# Patient Record
Sex: FEMALE | Race: WHITE | NOT HISPANIC OR LATINO | Employment: STUDENT | URBAN - METROPOLITAN AREA
[De-identification: names, ages, dates, MRNs, and addresses within clinical notes are randomized per-mention and may not be internally consistent; named-entity substitution may affect disease eponyms.]

---
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
## 2023-01-04 ENCOUNTER — OFFICE VISIT (OUTPATIENT)
Dept: FAMILY MEDICINE CLINIC | Facility: CLINIC | Age: 18
End: 2023-01-04
Payer: COMMERCIAL

## 2023-01-04 VITALS
HEART RATE: 75 BPM | TEMPERATURE: 98.4 F | HEIGHT: 67 IN | DIASTOLIC BLOOD PRESSURE: 80 MMHG | WEIGHT: 105 LBS | OXYGEN SATURATION: 99 % | BODY MASS INDEX: 16.48 KG/M2 | SYSTOLIC BLOOD PRESSURE: 116 MMHG

## 2023-01-04 DIAGNOSIS — F41.9 ANXIETY AND DEPRESSION: Primary | ICD-10-CM

## 2023-01-04 DIAGNOSIS — F32.A ANXIETY AND DEPRESSION: Primary | ICD-10-CM

## 2023-01-04 PROBLEM — R00.2 PALPITATIONS IN PEDIATRIC PATIENT: Status: RESOLVED | Noted: 2020-09-01 | Resolved: 2023-01-04

## 2023-01-04 PROBLEM — F50.82 AVOIDANT-RESTRICTIVE FOOD INTAKE DISORDER (ARFID): Status: ACTIVE | Noted: 2023-01-04

## 2023-01-04 PROBLEM — R55 PRE-SYNCOPE: Status: RESOLVED | Noted: 2019-01-11 | Resolved: 2023-01-04

## 2023-01-04 PROCEDURE — 99213 OFFICE O/P EST LOW 20 MIN: CPT | Performed by: FAMILY MEDICINE

## 2023-01-04 NOTE — PROGRESS NOTES
Subjective   Bhavani Hyde is a 17 y.o. female who is here for   Chief Complaint   Patient presents with   • Anxiety     Needing college accomodation paperwork filled out    .   If you all could please mention how it would be better for my mental health to live with my sister, I would greatly appreciate it   Im trying to move out of my [employer] dorm and the only way i am able to move out is if i said proof of my depression and anxiety. 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  Has longstanding anxiety depression eating disorders.  She has had a steady roommate in the door for the past 1-1/2 years.  But the roommate is now not returning and go and take school from home.  She will we will be receiving a random roommate which makes her quite anxious.  Good news is her older sister is also a student at [employer] and lives in an apartment style setting which is still University property.  With 2 roommates.  They have an opening for Bhavani.  Bhavani would likely thrive if she moves over with her older sister who provide her help with her anxiety depression and eating disorder.  She remains on 15 mg of Lexapro    The following portions of the patient's history were reviewed and updated as appropriate: allergies, current medications, past family history, past medical history, past social history, past surgical history and problem list.    Review of Systems    Objective   Vitals:    01/04/23 1439   BP: 116/80   BP Location: Left arm   Patient Position: Sitting   Cuff Size: Adult   Pulse: 75   Temp: 98.4 °F (36.9 °C)   SpO2: 99%   Weight: 47.6 kg (105 lb)   Height: 168.9 cm (66.5\")      Physical Exam  Vitals reviewed.   Constitutional:       Appearance: She is underweight.         Assessment & Plan   Diagnoses and all orders for this visit:    1. Anxiety and depression  (Primary)    Filled out a letter asking her University to allow her to change her living arrangements    There are no Patient Instructions on file for this visit.    There are no discontinued medications.     No follow-ups on file.    Dr. Alex Gant  [place], [place]

## 2023-02-22 DIAGNOSIS — N94.6 PAINFUL MENSTRUAL PERIODS: ICD-10-CM

## 2023-02-22 RX ORDER — NORGESTIMATE AND ETHINYL ESTRADIOL 0.25-0.035
1 KIT ORAL DAILY
Qty: 84 TABLET | Refills: 1 | Status: SHIPPED | OUTPATIENT
Start: 2023-02-22

## 2023-02-24 DIAGNOSIS — K21.9 GASTROESOPHAGEAL REFLUX DISEASE WITHOUT ESOPHAGITIS: ICD-10-CM

## 2023-02-24 RX ORDER — LANSOPRAZOLE 30 MG/1
30 CAPSULE, DELAYED RELEASE ORAL DAILY
Qty: 30 CAPSULE | Refills: 2 | Status: SHIPPED | OUTPATIENT
Start: 2023-02-24

## 2023-06-06 DIAGNOSIS — K21.9 GASTROESOPHAGEAL REFLUX DISEASE WITHOUT ESOPHAGITIS: ICD-10-CM

## 2023-06-06 DIAGNOSIS — F32.A ANXIETY AND DEPRESSION: ICD-10-CM

## 2023-06-06 DIAGNOSIS — Z13.220 SCREENING FOR LIPID DISORDERS: Primary | ICD-10-CM

## 2023-06-06 DIAGNOSIS — F41.9 ANXIETY AND DEPRESSION: ICD-10-CM

## 2023-10-11 ENCOUNTER — OFFICE VISIT (OUTPATIENT)
Dept: FAMILY MEDICINE CLINIC | Facility: CLINIC | Age: 18
End: 2023-10-11
Payer: COMMERCIAL

## 2023-10-11 VITALS
OXYGEN SATURATION: 98 % | BODY MASS INDEX: 16.48 KG/M2 | HEART RATE: 72 BPM | HEIGHT: 67 IN | DIASTOLIC BLOOD PRESSURE: 60 MMHG | TEMPERATURE: 98 F | WEIGHT: 105 LBS | SYSTOLIC BLOOD PRESSURE: 100 MMHG

## 2023-10-11 DIAGNOSIS — N30.00 ACUTE CYSTITIS WITHOUT HEMATURIA: ICD-10-CM

## 2023-10-11 DIAGNOSIS — R30.0 DYSURIA: Primary | ICD-10-CM

## 2023-10-11 DIAGNOSIS — K58.0 IRRITABLE BOWEL SYNDROME WITH DIARRHEA: ICD-10-CM

## 2023-10-11 LAB
BILIRUB BLD-MCNC: NEGATIVE MG/DL
CLARITY, POC: ABNORMAL
COLOR UR: YELLOW
GLUCOSE UR STRIP-MCNC: NEGATIVE MG/DL
KETONES UR QL: NEGATIVE
LEUKOCYTE EST, POC: ABNORMAL
NITRITE UR-MCNC: NEGATIVE MG/ML
PH UR: 5 [PH] (ref 5–8)
PROT UR STRIP-MCNC: ABNORMAL MG/DL
RBC # UR STRIP: ABNORMAL /UL
SP GR UR: 1.03 (ref 1–1.03)
UROBILINOGEN UR QL: NORMAL

## 2023-10-11 PROCEDURE — 99213 OFFICE O/P EST LOW 20 MIN: CPT | Performed by: FAMILY MEDICINE

## 2023-10-11 PROCEDURE — 81002 URINALYSIS NONAUTO W/O SCOPE: CPT | Performed by: FAMILY MEDICINE

## 2023-10-11 RX ORDER — ZINC OXIDE 13 %
1 CREAM (GRAM) TOPICAL DAILY
Qty: 90 CAPSULE | Refills: 1 | Status: SHIPPED | OUTPATIENT
Start: 2023-10-11

## 2023-10-11 RX ORDER — CIPROFLOXACIN 500 MG/1
500 TABLET, FILM COATED ORAL 2 TIMES DAILY
Qty: 6 TABLET | Refills: 0 | Status: SHIPPED | OUTPATIENT
Start: 2023-10-11

## 2023-10-11 RX ORDER — DICYCLOMINE HYDROCHLORIDE 10 MG/1
10 CAPSULE ORAL
Qty: 120 CAPSULE | Refills: 0 | Status: SHIPPED | OUTPATIENT
Start: 2023-10-11

## 2023-10-11 NOTE — PROGRESS NOTES
"  Subjective   Bhavani Hyde is a 18 y.o. female who is here for   Chief Complaint   Patient presents with    Abdominal Pain    Difficulty Urinating     History of Present Illness     Bhavani comes in with urinary tract symptoms.  Increased frequency burning with urination.  Her menstrual cycle ended a couple days ago.  On birth control pills.  Not sexually active.    Unfortunate she dropped out of college.  She was going to [employer].  She would like to enroll in [employer] college this spring.  Just landed a job as a .    Depression is somewhat stable.    Continues to have chronic abdominal complaints.  Not gaining weight.  Reports when she eats she has loose stools.  We checked her food allergy panel last time was normal.      The following portions of the patient's history were reviewed and updated as appropriate: allergies, current medications, past family history, past medical history, past social history, past surgical history, and problem list.    Review of Systems    Objective   Vitals:    10/11/23 1055   BP: 100/60   Pulse: 72   Temp: 98 øF (36.7 øC)   SpO2: 98%   Weight: 47.6 kg (105 lb)   Height: 168.9 cm (66.5\")      Physical Exam  Vitals reviewed.   Neurological:      Mental Status: She is alert.       Results for orders placed or performed in visit on 10/11/23   POC Urinalysis Dipstick    Specimen: Urine   Result Value Ref Range    Color Yellow Yellow, Straw, Dark Yellow, Mel    Clarity, UA Cloudy (A) Clear    Glucose, UA Negative Negative mg/dL    Bilirubin Negative Negative    Ketones, UA Negative Negative    Specific Gravity  1.030 1.005 - 1.030    Blood, UA Large (A) Negative    pH, Urine 5.0 5.0 - 8.0    Protein, POC 1+ (A) Negative mg/dL    Urobilinogen, UA Normal Normal, 0.2 E.U./dL    Leukocytes Small (1+) (A) Negative    Nitrite, UA Negative Negative         Assessment & Plan   Diagnoses and all orders for this visit:    1. Dysuria (Primary)  -     POC Urinalysis " Dipstick  -     ciprofloxacin (Cipro) 500 MG tablet; Take 1 tablet by mouth 2 (Two) Times a Day.  Dispense: 6 tablet; Refill: 0    2. Irritable bowel syndrome with diarrhea  -     dicyclomine (BENTYL) 10 MG capsule; Take 1 capsule by mouth 4 (Four) Times a Day Before Meals & at Bedtime.  Dispense: 120 capsule; Refill: 0  -     Probiotic Product (Probiotic Daily) capsule; Take 1 capsule by mouth Daily.  Dispense: 90 capsule; Refill: 1    3. Acute cystitis without hematuria  -     dicyclomine (BENTYL) 10 MG capsule; Take 1 capsule by mouth 4 (Four) Times a Day Before Meals & at Bedtime.  Dispense: 120 capsule; Refill: 0  -     Probiotic Product (Probiotic Daily) capsule; Take 1 capsule by mouth Daily.  Dispense: 90 capsule; Refill: 1      There are no Patient Instructions on file for this visit.    Medications Discontinued During This Encounter   Medication Reason    famotidine (PEPCID) 40 MG tablet *Therapy completed        No follow-ups on file.    Dr. Alex Gant  [place], [place]

## 2023-11-07 ENCOUNTER — TELEPHONE (OUTPATIENT)
Dept: FAMILY MEDICINE CLINIC | Facility: CLINIC | Age: 18
End: 2023-11-07

## 2023-11-07 NOTE — TELEPHONE ENCOUNTER
Caller: Bhavani Hyde    Relationship: Self    Best call back number: [phone]     What form or medical record are you requesting: LETTER STATING SHE IS ON MEDICATION FOR DEPRESSION AND ANXIETY     Who is requesting this form or medical record from you: [facility]     How would you like to receive the form or medical records (pick-up, mail, fax):     Timeframe paperwork needed: ASAP    Additional notes: PATIENT NEEDS TO HAVE A LETTER SENT TO [facility] STATING THAT SHE IS ON MEDICATION FOR ANXIETY AND DEPRESSION AND THAT IT IS PRESCRIBED BY HER PRIMARY CARE PROVIDER DR DOMINIQUE STEEL MD.     PLEASE CALL PATIENT WHEN THIS LETTER IS READY TO  AND SHE WILL COME BY TO GET IT.

## 2023-11-26 ENCOUNTER — HOSPITAL ENCOUNTER (EMERGENCY)
Facility: HOSPITAL | Age: 18
Discharge: HOME | End: 2023-11-26
Attending: EMERGENCY MEDICINE | Admitting: EMERGENCY MEDICINE
Payer: COMMERCIAL

## 2023-11-26 VITALS
WEIGHT: 105 LBS | HEART RATE: 86 BPM | OXYGEN SATURATION: 100 % | HEIGHT: 66 IN | TEMPERATURE: 97.8 F | DIASTOLIC BLOOD PRESSURE: 84 MMHG | RESPIRATION RATE: 17 BRPM | SYSTOLIC BLOOD PRESSURE: 130 MMHG | BODY MASS INDEX: 16.88 KG/M2

## 2023-11-26 DIAGNOSIS — F41.0 ANXIETY ATTACK: Primary | ICD-10-CM

## 2023-11-26 LAB
ALBUMIN SERPL-MCNC: 4.7 G/DL (ref 3.5–5.2)
ALBUMIN/GLOB SERPL: 1.7 G/DL
ALP SERPL-CCNC: 53 U/L (ref 39–117)
ALT SERPL W P-5'-P-CCNC: 13 U/L (ref 1–33)
ANION GAP SERPL CALCULATED.3IONS-SCNC: 8.6 MMOL/L (ref 5–15)
AST SERPL-CCNC: 19 U/L (ref 1–32)
BASOPHILS # BLD AUTO: 0.05 10*3/MM3 (ref 0–0.2)
BASOPHILS NFR BLD AUTO: 0.6 % (ref 0–1.5)
BILIRUB SERPL-MCNC: 0.8 MG/DL (ref 0–1.2)
BUN SERPL-MCNC: 9 MG/DL (ref 6–20)
BUN/CREAT SERPL: 10.5 (ref 7–25)
CALCIUM SPEC-SCNC: 9.7 MG/DL (ref 8.6–10.5)
CHLORIDE SERPL-SCNC: 102 MMOL/L (ref 98–107)
CO2 SERPL-SCNC: 24.4 MMOL/L (ref 22–29)
CREAT SERPL-MCNC: 0.86 MG/DL (ref 0.57–1)
DEPRECATED RDW RBC AUTO: 41.7 FL (ref 37–54)
EGFRCR SERPLBLD CKD-EPI 2021: 99.3 ML/MIN/1.73
EOSINOPHIL # BLD AUTO: 0.08 10*3/MM3 (ref 0–0.4)
EOSINOPHIL NFR BLD AUTO: 1 % (ref 0.3–6.2)
ERYTHROCYTE [DISTWIDTH] IN BLOOD BY AUTOMATED COUNT: 12.6 % (ref 12.3–15.4)
GLOBULIN UR ELPH-MCNC: 2.8 GM/DL
GLUCOSE SERPL-MCNC: 98 MG/DL (ref 65–99)
HCT VFR BLD AUTO: 40.9 % (ref 34–46.6)
HGB BLD-MCNC: 14.1 G/DL (ref 12–15.9)
IMM GRANULOCYTES # BLD AUTO: 0.02 10*3/MM3 (ref 0–0.05)
IMM GRANULOCYTES NFR BLD AUTO: 0.3 % (ref 0–0.5)
LYMPHOCYTES # BLD AUTO: 1.79 10*3/MM3 (ref 0.7–3.1)
LYMPHOCYTES NFR BLD AUTO: 22.4 % (ref 19.6–45.3)
MAGNESIUM SERPL-MCNC: 2.1 MG/DL (ref 1.7–2.2)
MCH RBC QN AUTO: 31.5 PG (ref 26.6–33)
MCHC RBC AUTO-ENTMCNC: 34.5 G/DL (ref 31.5–35.7)
MCV RBC AUTO: 91.5 FL (ref 79–97)
MONOCYTES # BLD AUTO: 0.78 10*3/MM3 (ref 0.1–0.9)
MONOCYTES NFR BLD AUTO: 9.8 % (ref 5–12)
NEUTROPHILS NFR BLD AUTO: 5.27 10*3/MM3 (ref 1.7–7)
NEUTROPHILS NFR BLD AUTO: 65.9 % (ref 42.7–76)
NRBC BLD AUTO-RTO: 0 /100 WBC (ref 0–0.2)
PHOSPHATE SERPL-MCNC: 3 MG/DL (ref 2.5–4.5)
PLATELET # BLD AUTO: 287 10*3/MM3 (ref 140–450)
PMV BLD AUTO: 9.8 FL (ref 6–12)
POTASSIUM SERPL-SCNC: 4 MMOL/L (ref 3.5–5.2)
PROT SERPL-MCNC: 7.5 G/DL (ref 6–8.5)
RBC # BLD AUTO: 4.47 10*6/MM3 (ref 3.77–5.28)
SODIUM SERPL-SCNC: 135 MMOL/L (ref 136–145)
WBC NRBC COR # BLD AUTO: 7.99 10*3/MM3 (ref 3.4–10.8)

## 2023-11-26 PROCEDURE — 85025 COMPLETE CBC W/AUTO DIFF WBC: CPT | Performed by: EMERGENCY MEDICINE

## 2023-11-26 PROCEDURE — 84100 ASSAY OF PHOSPHORUS: CPT | Performed by: EMERGENCY MEDICINE

## 2023-11-26 PROCEDURE — 80053 COMPREHEN METABOLIC PANEL: CPT | Performed by: EMERGENCY MEDICINE

## 2023-11-26 PROCEDURE — 83735 ASSAY OF MAGNESIUM: CPT | Performed by: EMERGENCY MEDICINE

## 2023-11-26 PROCEDURE — 99282 EMERGENCY DEPT VISIT SF MDM: CPT

## 2023-11-26 PROCEDURE — 36415 COLL VENOUS BLD VENIPUNCTURE: CPT

## 2023-11-27 ENCOUNTER — OFFICE VISIT (OUTPATIENT)
Dept: FAMILY MEDICINE CLINIC | Facility: CLINIC | Age: 18
End: 2023-11-27
Payer: COMMERCIAL

## 2023-11-27 VITALS
BODY MASS INDEX: 15.91 KG/M2 | HEART RATE: 86 BPM | SYSTOLIC BLOOD PRESSURE: 114 MMHG | DIASTOLIC BLOOD PRESSURE: 70 MMHG | WEIGHT: 99 LBS | HEIGHT: 66 IN | TEMPERATURE: 97.4 F | OXYGEN SATURATION: 100 %

## 2023-11-27 DIAGNOSIS — F32.A ANXIETY AND DEPRESSION: ICD-10-CM

## 2023-11-27 DIAGNOSIS — F50.82 AVOIDANT-RESTRICTIVE FOOD INTAKE DISORDER (ARFID): Primary | ICD-10-CM

## 2023-11-27 DIAGNOSIS — F41.9 ANXIETY AND DEPRESSION: ICD-10-CM

## 2023-11-27 DIAGNOSIS — K58.0 IRRITABLE BOWEL SYNDROME WITH DIARRHEA: ICD-10-CM

## 2023-11-27 DIAGNOSIS — F41.0 PANIC ATTACK: ICD-10-CM

## 2023-11-27 RX ORDER — ESCITALOPRAM OXALATE 20 MG/1
20 TABLET ORAL EVERY MORNING
Qty: 90 TABLET | Refills: 1 | Status: SHIPPED | OUTPATIENT
Start: 2023-11-27

## 2023-11-27 RX ORDER — FAMOTIDINE 40 MG/1
40 TABLET, FILM COATED ORAL 2 TIMES DAILY
COMMUNITY
Start: 2023-11-18

## 2023-11-27 RX ORDER — DICYCLOMINE HYDROCHLORIDE 10 MG/1
10 CAPSULE ORAL
Qty: 120 CAPSULE | Refills: 0 | Status: SHIPPED | OUTPATIENT
Start: 2023-11-27

## 2023-11-27 NOTE — PROGRESS NOTES
"  Subjective   Bhavani Hyde is a 18 y.o. female who is here for   Chief Complaint   Patient presents with    Irritable Bowel Syndrome   .     History of Present Illness       Bhavani had a panic attack yesterday.  She was at her friend's house.  Started getting very anxious felt hot and flushed and she passed out.  A sounds like a pulse oximeter was placed and her numbers were low?  Went to the emergency room  Notes reviewed.  Vital signs were stable labs were normal.  Diagnosed with panic attack    Her anorexia is also getting worse.  Does not want to eat very much.  Does not intentionally throw up.  But she does get nauseous sometimes and she ends up throwing up  She knows she is losing too much weight        The following portions of the patient's history were reviewed and updated as appropriate: allergies, current medications, past family history, past medical history, past social history, past surgical history, and problem list.    Review of Systems    Objective   Vitals:    11/27/23 1525   BP: 114/70   Pulse: 86   Temp: 97.4 °F (36.3 °C)   SpO2: 100%   Weight: 44.9 kg (99 lb)   Height: 167.6 cm (66\")      Physical Exam  Vitals reviewed.   Neurological:      Mental Status: She is alert.         Assessment & Plan   Diagnoses and all orders for this visit:    1. Avoidant-restrictive food intake disorder (ARFID) (Primary)    2. Anxiety and depression  -     escitalopram (LEXAPRO) 20 MG tablet; Take 1 tablet by mouth Every Morning.  Dispense: 90 tablet; Refill: 1    3. Irritable bowel syndrome with diarrhea  -     dicyclomine (BENTYL) 10 MG capsule; Take 1 capsule by mouth 4 (Four) Times a Day Before Meals & at Bedtime.  Dispense: 120 capsule; Refill: 0    4. Panic attack    We will increase her Lexapro to 20 mg daily  Continue the Bentyl  She and her mother will reach out to multiple therapists in the area for mental health counseling.    There are no Patient Instructions on file for this visit.    Medications " Discontinued During This Encounter   Medication Reason    ciprofloxacin (Cipro) 500 MG tablet *Therapy completed    escitalopram (LEXAPRO) 10 MG tablet Reorder    dicyclomine (BENTYL) 10 MG capsule Reorder        No follow-ups on file.    Dr. Alex Gant  [place], [place]

## 2023-11-27 NOTE — ED PROVIDER NOTES
Subjective   History of Present Illness  18-year-old female presents with complaint of numbness and tingling sensation to her face and fingers.  Symptoms started about an hour prior to arrival.  Onset was gradual.  Symptoms are equal in intensity on both sides.  Patient does endorse some anxiety.  States she had otherwise felt well.  Patient does state that she has history of anorexia but has had a chicken tenders and some waffles to eat today.  She does report this has been a little worse than usual lately and her weight is lower than it has been in a while.  No lightheadedness or syncope.  No palpitations.  No chest pain or shortness of breath.  Patient vapes.  Denies illicit drug use.  Patient took her prescribed dose of Lexapro today but does state that she is not consistent in taking this.  Patient saw psychiatry a few years ago when she was initially diagnosed with anxiety and depression but is not established with psychiatry or other form of therapeutic relationship.      Review of Systems   All other systems reviewed and are negative.      Past Medical History:   Diagnosis Date    Allergic     Asthma     GERD (gastroesophageal reflux disease)        No Known Allergies    Past Surgical History:   Procedure Laterality Date    ESOPHAGOSCOPY / EGD      dr owens    LABIAPLASTY  04/2018    TONSILLECTOMY Bilateral 10/2008       Family History   Problem Relation Age of Onset    Asthma Mother     Melanoma Father     Asthma Maternal Grandmother     Stroke Maternal Grandmother     Nephrolithiasis Maternal Grandmother     Macular degeneration Maternal Grandmother     Heart disease Maternal Grandfather     Hypertension Maternal Grandfather        Social History     Socioeconomic History    Marital status: Single    Number of children: 0   Tobacco Use    Smoking status: Never    Smokeless tobacco: Never   Vaping Use    Vaping Use: Never used   Substance and Sexual Activity    Alcohol use: No    Drug use: No    Sexual  activity: Not Currently     Partners: Male     Birth control/protection: Birth control pill           Objective   Physical Exam  Constitutional:       General: She is not in acute distress.     Appearance: She is not toxic-appearing.   HENT:      Head: Normocephalic and atraumatic.      Nose: Nose normal.      Mouth/Throat:      Mouth: Mucous membranes are moist.      Pharynx: Oropharynx is clear.   Eyes:      Extraocular Movements: Extraocular movements intact.      Pupils: Pupils are equal, round, and reactive to light.   Cardiovascular:      Rate and Rhythm: Normal rate and regular rhythm.      Heart sounds: Normal heart sounds.   Pulmonary:      Effort: Pulmonary effort is normal. No respiratory distress.      Breath sounds: Normal breath sounds.   Abdominal:      General: There is no distension.      Palpations: Abdomen is soft.      Tenderness: There is no abdominal tenderness.   Musculoskeletal:         General: No deformity or signs of injury.      Cervical back: Normal range of motion.   Skin:     General: Skin is warm and dry.   Neurological:      General: No focal deficit present.      Mental Status: She is alert and oriented to person, place, and time. Mental status is at baseline.      Cranial Nerves: No cranial nerve deficit.      Sensory: No sensory deficit.      Motor: No weakness.      Coordination: Coordination normal.      Gait: Gait normal.   Psychiatric:         Attention and Perception: Attention normal. She does not perceive auditory or visual hallucinations.         Mood and Affect: Mood is anxious.         Speech: Speech normal.         Behavior: Behavior normal.         Thought Content: Thought content normal. Thought content does not include homicidal or suicidal ideation.         Cognition and Memory: Cognition normal.         Judgment: Judgment normal.         Procedures           ED Course  ED Course as of 11/26/23 2021   Sun Nov 26, 2023 1949 Patient noted by nursing staff to be  hyperventilating upon arrival.  They were able to  her on some breathing techniques which are starting to help patient's symptoms.  Suspect that this is the source of patient's paresthesias but given her stated history of anorexia we will check some baseline labs as well to make sure this is not contributing. [TD]   2020 Labs reassuring.  Patient stable for discharge.  Provided a few phone numbers for options available to her for outpatient mental health treatment and recommended that she establish care with someone.  Also advised primary care follow-up. [TD]      ED Course User Index  [TD] Dima Butler MD                                           Medical Decision Making  Problems Addressed:  Anxiety attack: complicated acute illness or injury    Amount and/or Complexity of Data Reviewed  Labs: ordered.        Final diagnoses:   Anxiety attack       ED Disposition  ED Disposition       ED Disposition   Discharge    Condition   Stable    Comment   --               Alex Gant MD  [address] [facility] [id number] [phone]    In 2 days      [facility]  [address] [id number] [phone]  Schedule an appointment as soon as possible for a visit in 1 week      [facility]  [address]  [facility] [address]  [phone]  Schedule an appointment as soon as possible for a visit in 1 week      [facility]  [address]  [facility] [address]  [phone]  Schedule an appointment as soon as possible for a visit in 1 week           Medication List      No changes were made to your prescriptions during this visit.            Dima Butler MD  11/26/23 2021

## 2024-01-23 ENCOUNTER — OFFICE VISIT (OUTPATIENT)
Dept: FAMILY MEDICINE CLINIC | Facility: CLINIC | Age: 19
End: 2024-01-23
Payer: COMMERCIAL

## 2024-01-23 VITALS
WEIGHT: 101 LBS | SYSTOLIC BLOOD PRESSURE: 106 MMHG | OXYGEN SATURATION: 100 % | TEMPERATURE: 98 F | BODY MASS INDEX: 16.23 KG/M2 | HEART RATE: 82 BPM | DIASTOLIC BLOOD PRESSURE: 70 MMHG | HEIGHT: 66 IN

## 2024-01-23 DIAGNOSIS — F32.A ANXIETY AND DEPRESSION: Primary | ICD-10-CM

## 2024-01-23 DIAGNOSIS — F41.9 ANXIETY AND DEPRESSION: Primary | ICD-10-CM

## 2024-01-23 PROCEDURE — 99213 OFFICE O/P EST LOW 20 MIN: CPT | Performed by: FAMILY MEDICINE

## 2024-01-23 RX ORDER — ESCITALOPRAM OXALATE 20 MG/1
20 TABLET ORAL EVERY MORNING
Qty: 90 TABLET | Refills: 1 | Status: SHIPPED | OUTPATIENT
Start: 2024-01-23

## 2024-01-23 NOTE — PROGRESS NOTES
"  Subjective   Bhavani Hyde is a 18 y.o. female who is here for   Chief Complaint   Patient presents with    Anxiety    Depression   .     History of Present Illness     Bhavani comes in today to follow-up on her anxiety and depression.  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  She is working every day from 7 AM to 3 PM.  Going to work every day.  Sleeping well.  Is eating a little better recently  Weight is up a few pounds  Her relationship with food seems to be getting better  We discussed perhaps starting exercise again.  She did play basketball in high school      The following portions of the patient's history were reviewed and updated as appropriate: allergies, current medications, past family history, past medical history, past social history, past surgical history, and problem list.    Review of Systems    Objective   Vitals:    01/23/24 1419   BP: 106/70   Pulse: 82   Temp: 98 °F (36.7 °C)   SpO2: 100%   Weight: 45.8 kg (101 lb)   Height: 167.6 cm (66\")      Physical Exam  Vitals reviewed.   Neurological:      Mental Status: She is alert.   Psychiatric:         Mood and Affect: Mood normal.         Assessment & Plan   Diagnoses and all orders for this visit:    1. Anxiety and depression (Primary)  -     escitalopram (LEXAPRO) 20 MG tablet; Take 1 tablet by mouth Every Morning.  Dispense: 90 tablet; Refill: 1      There are no Patient Instructions on file for this visit.    Medications Discontinued During This Encounter   Medication Reason    albuterol sulfate  (90 Base) MCG/ACT inhaler *Therapy completed    escitalopram (LEXAPRO) 20 MG tablet Reorder        No follow-ups on file.    Dr. Alex Gant  [place], [place]    "

## 2024-07-17 ENCOUNTER — OFFICE VISIT (OUTPATIENT)
Dept: FAMILY MEDICINE CLINIC | Facility: CLINIC | Age: 19
End: 2024-07-17
Payer: COMMERCIAL

## 2024-07-17 VITALS
HEART RATE: 57 BPM | SYSTOLIC BLOOD PRESSURE: 100 MMHG | WEIGHT: 106 LBS | OXYGEN SATURATION: 99 % | TEMPERATURE: 97.5 F | DIASTOLIC BLOOD PRESSURE: 80 MMHG | HEIGHT: 66 IN | BODY MASS INDEX: 17.04 KG/M2

## 2024-07-17 DIAGNOSIS — F32.A ANXIETY AND DEPRESSION: ICD-10-CM

## 2024-07-17 DIAGNOSIS — N94.6 PAINFUL MENSTRUAL PERIODS: ICD-10-CM

## 2024-07-17 DIAGNOSIS — F41.9 ANXIETY AND DEPRESSION: ICD-10-CM

## 2024-07-17 PROCEDURE — 99213 OFFICE O/P EST LOW 20 MIN: CPT | Performed by: FAMILY MEDICINE

## 2024-07-17 RX ORDER — NORGESTIMATE AND ETHINYL ESTRADIOL 0.25-0.035
1 KIT ORAL DAILY
Qty: 84 TABLET | Refills: 3 | Status: SHIPPED | OUTPATIENT
Start: 2024-07-17

## 2024-07-17 RX ORDER — ESCITALOPRAM OXALATE 20 MG/1
20 TABLET ORAL EVERY MORNING
Qty: 90 TABLET | Refills: 1 | Status: SHIPPED | OUTPATIENT
Start: 2024-07-17

## 2024-07-17 NOTE — PROGRESS NOTES
"  Subjective   Bhavani Hyde is a 19 y.o. female who is here for   Chief Complaint   Patient presents with    Anxiety    Depression   .     History of Present Illness     Overall feeling well.  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  Is considering going back to college at [employer] for a[employer] technology      The following portions of the patient's history were reviewed and updated as appropriate: allergies, current medications, past family history, past medical history, past social history, past surgical history, and problem list.    Review of Systems    Objective   Vitals:    07/17/24 1504   BP: 100/80   Pulse: 57   Temp: 97.5 °F (36.4 °C)   SpO2: 99%   Weight: 48.1 kg (106 lb)   Height: 167.6 cm (66\")      Physical Exam  Vitals reviewed.   Constitutional:       Appearance: She is underweight.   Neurological:      Mental Status: She is alert.   Psychiatric:         Mood and Affect: Mood normal.         Assessment & Plan   Diagnoses and all orders for this visit:    1. Anxiety and depression  -     escitalopram (LEXAPRO) 20 MG tablet; Take 1 tablet by mouth Every Morning.  Dispense: 90 tablet; Refill: 1    2. Painful menstrual periods  -     norgestimate-ethinyl estradiol (ORTHO-CYCLEN) 0.25-35 MG-MCG per tablet; Take 1 tablet by mouth Daily.  Dispense: 84 tablet; Refill: 3      There are no Patient Instructions on file for this visit.    Medications Discontinued During This Encounter   Medication Reason    dicyclomine (BENTYL) 10 MG capsule *Therapy completed    famotidine (PEPCID) 40 MG tablet *Therapy completed    lansoprazole (PREVACID) 30 MG capsule *Therapy completed    ondansetron (Zofran) 8 MG tablet *Therapy completed    escitalopram (LEXAPRO) 20 MG tablet Reorder    norgestimate-ethinyl estradiol (ORTHO-CYCLEN) 0.25-35 MG-MCG per tablet Reorder        No follow-ups on file.    Dr. Alex Gant  [place], [place]    "

## 2024-08-09 ENCOUNTER — OFFICE VISIT (OUTPATIENT)
Dept: OBSTETRICS AND GYNECOLOGY | Age: 19
End: 2024-08-09
Payer: COMMERCIAL

## 2024-08-09 ENCOUNTER — PATIENT ROUNDING (BHMG ONLY) (OUTPATIENT)
Dept: OBSTETRICS AND GYNECOLOGY | Age: 19
End: 2024-08-09
Payer: COMMERCIAL

## 2024-08-09 VITALS
BODY MASS INDEX: 17 KG/M2 | DIASTOLIC BLOOD PRESSURE: 70 MMHG | WEIGHT: 105.8 LBS | SYSTOLIC BLOOD PRESSURE: 118 MMHG | HEIGHT: 66 IN

## 2024-08-09 DIAGNOSIS — Z30.09 ENCOUNTER FOR OTHER GENERAL COUNSELING OR ADVICE ON CONTRACEPTION: Primary | ICD-10-CM

## 2024-08-09 DIAGNOSIS — Z30.41 ORAL CONTRACEPTIVE USE: ICD-10-CM

## 2024-08-09 DIAGNOSIS — Z76.89 ENCOUNTER TO ESTABLISH CARE: ICD-10-CM

## 2024-08-09 PROBLEM — J45.990 EXERCISE-INDUCED ASTHMA: Status: RESOLVED | Noted: 2019-08-07 | Resolved: 2024-08-09

## 2024-08-09 PROBLEM — N94.6 PAINFUL MENSTRUAL PERIODS: Status: RESOLVED | Noted: 2018-10-19 | Resolved: 2024-08-09

## 2024-08-09 PROCEDURE — 99203 OFFICE O/P NEW LOW 30 MIN: CPT | Performed by: STUDENT IN AN ORGANIZED HEALTH CARE EDUCATION/TRAINING PROGRAM

## 2024-08-09 NOTE — PROGRESS NOTES
[facility]   Obstetrics and Gynecology   New Gynecology Visit    2024    Patient: Bhavani Hyde          MR#:[id number]    History of Present Illness    Chief Complaint   Patient presents with    [facility]     NGYN - Birth control consult, OCP use but wants to discuss Nexplanon       19 y.o. female  who presents to discuss birth control.  She has never been to gyn.  She is not mentally prepared for annual exam today but wants to complete at a later date.  Currently taking OCP but struggles with compliance.  She wants to discuss other options.  Considering Nexplanon.  Reports regular monthly menses without issue.    Obstetric History:  OB History          0    Para   0    Term   0       0    AB   0    Living   0         SAB   0    IAB   0    Ectopic   0    Molar   0    Multiple   0    Live Births   0               Menstrual History:     Patient's last menstrual period was 2024 (approximate).       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  ________________________________________  Patient Active Problem List   Diagnosis    Gastroesophageal reflux disease without esophagitis    Anxiety and depression    Avoidant-restrictive food intake disorder (ARFID)    Irritable bowel syndrome with diarrhea    Panic attack     Past Medical History:   Diagnosis Date    Anxiety     Depression      Past Surgical History:   Procedure Laterality Date    ESOPHAGOSCOPY / EGD      dr owens    LABIAPLASTY  2018    TONSILLECTOMY Bilateral 10/2008    WISDOM TOOTH EXTRACTION       Social History     Tobacco Use   Smoking Status Never    Passive exposure: Never   Smokeless Tobacco Never     Family History   Problem Relation Age of Onset    Melanoma Father     Asthma Mother     Asthma Maternal Grandmother     Stroke Maternal Grandmother      "Nephrolithiasis Maternal Grandmother     Macular degeneration Maternal Grandmother     Heart disease Maternal Grandfather     Hypertension Maternal Grandfather     Breast cancer Neg Hx     Ovarian cancer Neg Hx     Uterine cancer Neg Hx     Colon cancer Neg Hx      Prior to Admission medications    Medication Sig Start Date End Date Taking? Authorizing Provider   escitalopram (LEXAPRO) 20 MG tablet Take 1 tablet by mouth Every Morning. 7/17/24  Yes Alex Gant MD   norgestimate-ethinyl estradiol (ORTHO-CYCLEN) 0.25-35 MG-MCG per tablet Take 1 tablet by mouth Daily. 7/17/24  Yes Alex Gant MD   Probiotic Product (Probiotic Daily) capsule Take 1 capsule by mouth Daily. 10/11/23  Yes Alex Gant MD     ________________________________________    The following portions of the patient's history were reviewed and updated as appropriate: allergies, current medications, past family history, past medical history, past social history, past surgical history, and problem list.    Review of Systems   All other systems reviewed and are negative.           Objective     /70   Ht 167.6 cm (66\")   Wt 48 kg (105 lb 12.8 oz)   LMP 07/26/2024 (Approximate)   Breastfeeding No   BMI 17.08 kg/m²    BP Readings from Last 3 Encounters:   08/09/24 118/70   07/17/24 100/80   01/23/24 106/70      Wt Readings from Last 3 Encounters:   08/09/24 48 kg (105 lb 12.8 oz)   07/17/24 48.1 kg (106 lb)   01/23/24 45.8 kg (101 lb)        BMI: Estimated body mass index is 17.08 kg/m² as calculated from the following:    Height as of this encounter: 167.6 cm (66\").    Weight as of this encounter: 48 kg (105 lb 12.8 oz).    Physical Exam  Vitals and nursing note reviewed.   Constitutional:       General: She is not in acute distress.     Appearance: Normal appearance.   Pulmonary:      Effort: Pulmonary effort is normal. No respiratory distress.   Neurological:      General: No focal deficit present.      Mental Status: " She is alert and oriented to person, place, and time.   Psychiatric:         Mood and Affect: Mood normal.         Behavior: Behavior normal.         Thought Content: Thought content normal.         Judgment: Judgment normal.                 Assessment:  Diagnoses and all orders for this visit:    1. Encounter for other general counseling or advice on contraception (Primary)    2. Oral contraceptive use    3. Encounter to establish care    -Discussed all contraceptive options.  No contraindications.  Patient is most interested in Nexplanon.  Discussed that irregular menses occur in approximately one third of rounds with Nexplanon.  She reports that if she is bothered by this, she would likely try an IUD next.  - Discussed what is involved in annual exam.  Patient wants to complete at next visit.      Plan:  Return in about 3 weeks (around 8/30/2024) for Annual with Nexplanon insertion.      Kelly Walden MD  8/9/2024 16:56 EDT    I spent 30 minutes caring for Bhavani Hyde on this date of service. This time includes time spent by me in the following activities: preparing for the visit, reviewing tests, obtaining and/or reviewing a separately obtained history, performing a medically appropriate examination and/or evaluation, counseling and educating the patient/family/caregiver, ordering medications, tests, or procedures and documenting information in the medical record.  This time does NOT include time spent on separately reported services.

## 2024-08-09 NOTE — PROGRESS NOTES
A MY CHART MESSAGE HAS BEEN SENT TO THE PATIENT FOR [facility] ROUNDING.

## 2024-09-05 ENCOUNTER — OFFICE VISIT (OUTPATIENT)
Dept: OBSTETRICS AND GYNECOLOGY | Age: 19
End: 2024-09-05
Payer: COMMERCIAL

## 2024-09-05 VITALS
SYSTOLIC BLOOD PRESSURE: 110 MMHG | WEIGHT: 104 LBS | HEIGHT: 66 IN | BODY MASS INDEX: 16.71 KG/M2 | DIASTOLIC BLOOD PRESSURE: 72 MMHG

## 2024-09-05 DIAGNOSIS — Z11.3 SCREENING FOR STDS (SEXUALLY TRANSMITTED DISEASES): ICD-10-CM

## 2024-09-05 DIAGNOSIS — Z01.419 WELL WOMAN EXAM WITH ROUTINE GYNECOLOGICAL EXAM: Primary | ICD-10-CM

## 2024-09-05 DIAGNOSIS — Z12.4 SCREENING FOR MALIGNANT NEOPLASM OF THE CERVIX: ICD-10-CM

## 2024-09-05 DIAGNOSIS — Z23 NEED FOR HPV VACCINE: ICD-10-CM

## 2024-09-05 DIAGNOSIS — Z30.017 NEXPLANON INSERTION: ICD-10-CM

## 2024-09-05 LAB
B-HCG UR QL: NEGATIVE
EXPIRATION DATE: NORMAL
INTERNAL NEGATIVE CONTROL: NEGATIVE
INTERNAL POSITIVE CONTROL: POSITIVE
Lab: NORMAL

## 2024-09-05 NOTE — PROGRESS NOTES
"Subjective     History of Present Illness    Chief Complaint   Patient presents with    Gynecologic Exam     Ae & nexplanon Insertion(pharmacy covered)       Bhavani Hyde is a 19 y.o. female who presents for annual exam.  Menses are regular every 28-30 days, lasting 4-7 days, dysmenorrhea none     Nexplanon insertion today. R handed, will place on L arm.  First pap today.  Request STD testing by swab.  Has not received Gardasil vaccine series.  Patient agreeable to completing vaccine series, but would like to start at annual exam next year since she is having Nexplanon inserted today.  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    Obstetric History:  OB History          0    Para   0    Term   0       0    AB   0    Living   0         SAB   0    IAB   0    Ectopic   0    Molar   0    Multiple   0    Live Births   0               Menstrual History:     Patient's last menstrual period was 2024 (approximate).           Current contraception:  OCP currently, getting nexplanon today  History of abnormal Pap smear:  n/a - first pap today  Received Gardasil immunization: no  Perform regular self breast exam: no  Family history of uterine or ovarian cancer: no  Family History of colon cancer: no  Family history of breast cancer: no    PAP: done today  Mammogram: not indicated  Colonoscopy: not indicated  DEXA: not indicated    Exercise: moderately active  Calcium/Vitamin D: adequate intake    The following portions of the patient's history were reviewed and updated as appropriate: allergies, current medications, past family history, past medical history, past social history, past surgical history, and problem list.    Review of Systems  A comprehensive review of systems was negative.       Objective   Physical Exam    /72   Ht 167.6 cm (66\")   Wt 47.2 kg (104 lb)   LMP 2024 (Approximate)   BMI 16.79 kg/m²      General: alert, appears stated age, " cooperative, and no distress   Heart: regular rate and rhythm, S1, S2 normal, no murmur, click, rub or gallop   Lungs: clear to auscultation bilaterally   Abdomen: soft, non-tender, without masses or organomegaly   Breast: inspection negative, no nipple discharge or bleeding, no masses or nodularity palpable   External genitalia/Vulva: External genitalia including bartholin's glands, Urethra, [place]'s gland and urethra meatus are normal and Bladder appears normal without significant prolapse    Vagina: normal mucosa, normal discharge   Cervix: no lesions   Uterus: normal size and non-tender   Adnexa: normal adnexa and no mass, fullness, tenderness   Neurologic: Alert and Oriented x3   Psychiatric: Normal affect, judgement, and mood     Nexplanon Insertion    Patient's last menstrual period was 08/26/2024 (approximate).    Date of procedure:  9/5/2024    Risks and benefits discussed? yes  All questions answered? yes  Consents given by the patient  Written consent obtained? Yes  Time out was performed prior to procedure.     Local anesthesia used:  yes - 5 cc's of  Meds; anesthesia local: 1% lidocaine with epinephrine    Procedure documentation:     Urine pregnancy test was done and was NEGATIVE .  The risks (including infection,  bruising, irregular bleeding, pain at insertion site, and injury to muscles, nerves  and blood vessesl) and benefits of the procedure were explained to the patient and/or guardian and Written informed consent was obtained.     The upper left arm (non-dominant) was marked at the intended site of insertion.  Betadine was used to cleanse the skin.  Local anesthesia was injected.  The Nexplanon was placed subdermally without difficulty and according to manufacturers instructions.  The device was able to be palpated in the arm by both myself and the patient.  Steri-strips and band aid were then placed across the site of insertion and a pressure bandage was applied.    She tolerated the procedure  well.  There were no complications.  EBL was minimal.    Post procedure instructions:          The patient was advised to call for any rash, arm pain, fever, warmth or for prolonged bruising or bleeding. Remove the wrapping in 24 hours and the steri-strips in 5 days.    Follow up needed: PRN    Assessment & Plan   Diagnoses and all orders for this visit:    1. Well woman exam with routine gynecological exam (Primary)  -     IGP, Rfx Aptima HPV ASCU    2. Screening for malignant neoplasm of the cervix  -     IGP, Rfx Aptima HPV ASCU    3. Nexplanon insertion  -     Discontinue: Etonogestrel (NEXPLANON) 68 MG subdermal implant  -     POC Pregnancy, Urine  -     Etonogestrel (NEXPLANON) 68 MG subdermal implant    4. Screening for STDs (sexually transmitted diseases)  -     Chlamydia trachomatis, Neisseria gonorrhoeae, Trichomonas vaginalis, PCR - Swab, Vagina    5. Need for HPV vaccine  -     Discontinue: HPV 9-Valent Recomb Vaccine suspension 0.5 mL          PAP smear completed today  STD swab completed today  Nexplanon inserted on L arm without difficulty, see procedure note above. Advised continued condom use for STD prevention.  Will call patient with results and treat accordingly.   All questions answered.  Breast self exam technique reviewed and patient encouraged to perform self-exam monthly.  Physical activity and regular exercise encouraged.  Discussed healthy lifestyle modifications.  Sexual transmitted disease prevention discussed  Vaccinations/immunizations discussed  Contraception discussed  Need for HPV vaccine - after having nexplanon inserted, patient decided to not receive 1st gardasil vaccine today. Patient states she will start vaccine series at next annual appointment.  Informed patient she could also call and come in for nurses appointment to complete vaccine series if she would like before next year.    Return in about 1 year (around 9/5/2025) for Annual Exam .

## 2024-09-08 LAB
C TRACH RRNA SPEC QL NAA+PROBE: POSITIVE
N GONORRHOEA RRNA SPEC QL NAA+PROBE: NEGATIVE
T VAGINALIS RRNA SPEC QL NAA+PROBE: NEGATIVE

## 2024-09-09 DIAGNOSIS — A56.8 CHLAMYDIA TRACHOMATIS INFECTION: Primary | ICD-10-CM

## 2024-09-09 RX ORDER — DOXYCYCLINE 100 MG/1
100 CAPSULE ORAL 2 TIMES DAILY
Qty: 14 CAPSULE | Refills: 0 | Status: SHIPPED | OUTPATIENT
Start: 2024-09-09 | End: 2024-09-16

## 2024-09-11 LAB
CONV .: NORMAL
CYTOLOGIST CVX/VAG CYTO: NORMAL
CYTOLOGY CVX/VAG DOC CYTO: NORMAL
CYTOLOGY CVX/VAG DOC THIN PREP: NORMAL
DX ICD CODE: NORMAL
Lab: NORMAL
OTHER STN SPEC: NORMAL
STAT OF ADQ CVX/VAG CYTO-IMP: NORMAL

## 2024-10-30 ENCOUNTER — OFFICE VISIT (OUTPATIENT)
Dept: FAMILY MEDICINE CLINIC | Facility: CLINIC | Age: 19
End: 2024-10-30
Payer: COMMERCIAL

## 2024-10-30 VITALS
BODY MASS INDEX: 17.33 KG/M2 | HEIGHT: 66 IN | SYSTOLIC BLOOD PRESSURE: 118 MMHG | DIASTOLIC BLOOD PRESSURE: 80 MMHG | OXYGEN SATURATION: 98 % | TEMPERATURE: 97 F | WEIGHT: 107.8 LBS | HEART RATE: 76 BPM

## 2024-10-30 DIAGNOSIS — R40.20 LOSS OF CONSCIOUSNESS: ICD-10-CM

## 2024-10-30 DIAGNOSIS — S09.90XA INJURY OF HEAD, INITIAL ENCOUNTER: ICD-10-CM

## 2024-10-30 DIAGNOSIS — R94.31 ABNORMAL ECG: ICD-10-CM

## 2024-10-30 DIAGNOSIS — R55 SYNCOPE, UNSPECIFIED SYNCOPE TYPE: Primary | ICD-10-CM

## 2024-10-30 DIAGNOSIS — R42 DIZZINESS: ICD-10-CM

## 2024-10-30 PROCEDURE — 93000 ELECTROCARDIOGRAM COMPLETE: CPT

## 2024-10-30 PROCEDURE — 99214 OFFICE O/P EST MOD 30 MIN: CPT

## 2024-10-30 NOTE — PROGRESS NOTES
" Subjective:      Bhavani Hyde is a 19 y.o. female who presents for evaluation of syncope/near-syncope. Episode occurred about  today at work  . Patient has had prior episodes. Describes feeling \"high\" and head was lightheaded. Sat down and put head down and had loss of consciousness for a few seconds. Hit her head on a table during the episode. There was complete loss of consciousness for about 5 seconds. The episode was witnessed. Onset was over several seconds. Duration of the episode was a few minutes. There  was a change of mental status after the event where she felt \"out of it\". Preceding/concomitant actions: ringing in ears and dizziness . Symptoms prior to event:lightheadedness. There is not a personal history of heart disease e.g. ASHD/CAD, aortic stenosis/other valvular disease. There is not a personal history of pulmonary hypertension. There is not a personal history of hypokalemia, hypomagnesemia or Long QT syndrome. There is not a family history of sudden death. There is a family history of heart disease e.g. ASHD/CAD, aortic stenosis/other valvular disease paternal grandfather. Father reports that she skips meals frequently and thinks this is caused by hypoglycemia or dehydration.    The following portions of the patient's history were reviewed and updated as appropriate: allergies, current medications, past family history, past medical history, past social history, past surgical history, and problem list.    Review of Systems  A comprehensive review of systems was negative except for: Neurological: positive for dizziness       Objective:      Physical Exam  General appearance: alert, cooperative, and no distress  Head: Normocephalic, without obvious abnormality, atraumatic, no edema or laceration posterior skull at site that head hit table  Eyes: conjunctivae/corneas clear. PERRL, EOM's intact. Fundi benign.  Ears: normal TM's and external ear canals both ears  Throat: lips, mucosa, and tongue " normal; teeth and gums normal  Lungs: clear to auscultation bilaterally  Heart: regular rate and rhythm, S1, S2 normal, no murmur, click, rub or gallop  Pulses: 2+ and symmetric  Lymph nodes: Cervical, supraclavicular, and axillary nodes normal.  Neurologic: Alert and oriented X 3, normal strength and tone. Normal symmetric reflexes. Normal coordination and gait  Motor:grossly normal  Reflexes: 2+ and symmetric  Coordination: normal  Gait: Normal    Cardiographics  ECG: rhythm: normal sinus rhythm, rate=69 bpm, vv=600 ms, qrs=77 ms, bz=233/380 ms, axis=45 68 62 degrees  Echocardiogram: abnormal and reviewed by myself possible right ventricular conduction delay V1/V2.      Lab Review   Lab Results   Component Value Date     (L) 11/26/2023    K 4.0 11/26/2023     11/26/2023    CO2 24.4 11/26/2023    BUN 9 11/26/2023    CREATININE 0.86 11/26/2023    GLUCOSE 98 11/26/2023    CALCIUM 9.7 11/26/2023          Assessment:      Loss of consciousness of uncertain cause.       Plan:      ECG.   Referral to cardiology  CT head w/o contrast    Diagnoses and all orders for this visit:    1. Syncope, unspecified syncope type (Primary)  2. Abnormal ECG  3. Dizziness  ECG in office today with new finding of possible right ventricular conduction delay. Will refer to cardiology for evaluation and further recommendation.   -     ECG 12 Lead  -     Ambulatory Referral to Cardiology    4. Loss of consciousness  5. Injury of head, initial encounter  New undiagnosed problem with uncertain prognosis. Will obtain CT head without contrast to evaluate for possible head injury and await results for further recommendation.  -     CT Head Without Contrast; Future    Information sent to patient on syncope and hypoglycemia as possible etiologies for episode today. Given new finding on ECG, will send to cardiology to rule out new cardiac issue contributing to LOC.        Procedure     ECG 12 Lead    Date/Time: 10/30/2024 4:40  PM  Performed by: Deborah Snyder APRN    Authorized by: Deborah Snyder APRN  Comparison: compared with previous ECG   Comparison to previous ECG: Previous ECGs 2019 and 2020 with NSR and sinus bradycardia with HR 56 BPM  Rhythm: sinus rhythm  Rate: normal  Conduction comments: Possible right ventricular conduction delay in V1/V2  ST Segments: ST segments normal  T Waves: T waves normal  QRS axis: normal  Other: no other findings    Clinical impression: abnormal EKG

## 2024-11-19 ENCOUNTER — OFFICE VISIT (OUTPATIENT)
Dept: CARDIOLOGY | Facility: CLINIC | Age: 19
End: 2024-11-19
Payer: COMMERCIAL

## 2024-11-19 VITALS
DIASTOLIC BLOOD PRESSURE: 70 MMHG | SYSTOLIC BLOOD PRESSURE: 110 MMHG | HEIGHT: 66 IN | BODY MASS INDEX: 16.79 KG/M2 | RESPIRATION RATE: 16 BRPM | HEART RATE: 64 BPM | OXYGEN SATURATION: 97 % | WEIGHT: 104.5 LBS

## 2024-11-19 DIAGNOSIS — R55 VASOVAGAL SYNCOPE: Primary | ICD-10-CM

## 2024-11-19 DIAGNOSIS — R55 SYNCOPE AND COLLAPSE: ICD-10-CM

## 2024-11-19 PROCEDURE — 99204 OFFICE O/P NEW MOD 45 MIN: CPT | Performed by: STUDENT IN AN ORGANIZED HEALTH CARE EDUCATION/TRAINING PROGRAM

## 2024-11-19 NOTE — PROGRESS NOTES
[facility]    Subjective:     Encounter Date:11/19/24      Patient ID: Bhavani Hyde is a 19 y.o. female.    Chief Complaint:   Chief Complaint   Patient presents with    New Patient    Abnormal ECG    Syncope    Dizziness      History of Present Illness    Bhavani Hyde is a 19 y.o.-year-old lady with a past medical history of anxiety and depression, presents for further evaluation of a syncopal episode.    She was at work, she was standing on her feet, she then began to notice a sensation of feeling flushed, followed by ringing in the ears, tunnel vision, and then she slowly fell to the floor.  It was a witnessed episode, there is about a 5-second loss of consciousness.  She does not recall any other preceding prodrome or palpitations or other events.  She denies any palpitations and otherwise is healthy without complaint.  She has been on Lexapro for some time    She reports that she has had several episodes like this in the past.  Both in middle and high school she would have similar spells with typical characteristics per above, and she reports she saw a cardiologist when she was in middle school and thinks she was diagnosed with vasovagal episodes.    She was saw her PCP who obtained an EKG and there was an RSR prime pattern in lead V1 and they were concerned of RV conduction disease per the ECG readout.  She presents today for further evaluation of this.    Previous Cardiac Testing:  None    The following portions of the patient's history were reviewed and updated as appropriate: allergies, current medications, past family history, past medical history, past social history, past surgical history and problem list.    Past Medical History:   Diagnosis Date    Anxiety     Depression        Past Surgical History:   Procedure Laterality Date    ESOPHAGOSCOPY / EGD      dr owens    LABIAPLASTY  04/2018    TONSILLECTOMY Bilateral 10/2008    WISDOM TOOTH EXTRACTION       I directly reviewed  "and interpreted the ECG that was obtained from the PCP office on October 30, 2024.  It shows sinus rhythm with RSR prime pattern.  I do not appreciate any significant pathologic conduction delays, QT prolongation, epsilon waves, or other pathology.  This is a normal variant ECG.  Does not appear appreciably different from ECGs obtained in 2020    Procedures       Objective:     Vitals:    11/19/24 0845   BP: 110/70   BP Location: Right arm   Patient Position: Sitting   Cuff Size: Adult   Pulse: 64   Resp: 16   SpO2: 97%   Weight: 47.4 kg (104 lb 8 oz)   Height: 167.6 cm (66\")         Constitutional:       Appearance: Healthy appearance. Not in distress.   Neck:      Vascular: JVD normal.   Pulmonary:      Effort: Pulmonary effort is normal.      Breath sounds: Normal breath sounds.   Cardiovascular:      PMI at left midclavicular line. Normal rate. Regular rhythm. Normal S2.       Murmurs: There is no murmur.      Comments: No murmurs observed.  No murmurs with Valsalva.  Pulses:     Intact distal pulses.   Edema:     Peripheral edema absent.   Skin:     General: Skin is warm and dry.   Neurological:      General: No focal deficit present.      Mental Status: Alert, oriented to person, place, and time and oriented to person, place and time.   Psychiatric:         Mood and Affect: Mood and affect normal.         Lab Review:       BUN   Date Value Ref Range Status   11/26/2023 9 6 - 20 mg/dL Final     Creatinine   Date Value Ref Range Status   11/26/2023 0.86 0.57 - 1.00 mg/dL Final     Potassium   Date Value Ref Range Status   11/26/2023 4.0 3.5 - 5.2 mmol/L Final     ALT (SGPT)   Date Value Ref Range Status   11/26/2023 13 1 - 33 U/L Final     AST (SGOT)   Date Value Ref Range Status   11/26/2023 19 1 - 32 U/L Final         Performed        Assessment:          Diagnosis Plan   1. Vasovagal syncope  CBC & Differential    Comprehensive Metabolic Panel    TSH Rfx On Abnormal To Free T4      2. Syncope and collapse  " CBC & Differential    Comprehensive Metabolic Panel    TSH Rfx On Abnormal To Free T4             Plan:         Syncope: This is an undiagnosed, new problem with an uncertain prognosis.  I do suspect vasovagal origin.  She had a typical prodrome of lightheadedness, ringing in the ears, and then slowly going down.  She does not give any other high risk features, her ECG does not have any other sinister findings on it.  This is very likely a normal variant ECG.  She has no family history of sudden cardiac death or pathologic arrhythmias.  However, other causes have not been ruled out   She has not had labs in some time, we will check for significant anemia, check CBC, electrolyte panel and TSH to look for other reversible causes.  I directly reviewed interpret the patient'sPA and lateral radiograph obtained on November 21, 2016 which shows a normal cardiac silhouette.  No significant cardiac enlargement is observed  Vagal exercises were discussed  Encouraged hydration  If spells return, or change or become more abrupt, we can consider Holter monitoring and an echocardiogram, we discussed the rationale for additional testing we shared the decision to hold off on additional testing at this time  History of anxiety and depression: Per PCP.  She remains on Lexapro.  QT interval is fine.    Thank you for allowing me to participate in the care of Bhavani Hyde. Feel free to contact me directly with any further questions or concerns.    RTC as needed    Kali Eaton MD  [facility]  11/19/24  08:37 EST       Current Outpatient Medications:     escitalopram (LEXAPRO) 20 MG tablet, Take 1 tablet by mouth Every Morning., Disp: 90 tablet, Rfl: 1    Etonogestrel (NEXPLANON) 68 MG implant subdermal implant, To be inserted one time by prescriber. Route Subdermal., Disp: , Rfl:          No follow-ups on file.      Part of this note may be an electronic transcription/translation of spoken language to printed text  using the Dragon Dictation System.

## 2024-11-19 NOTE — PATIENT INSTRUCTIONS
I would look into something called vagal exercises which can help reduce the frequency of the spells.     In the meantime, increase electrolyte beverage intake, this can help reduce the full passing out spells.

## 2025-01-15 ENCOUNTER — OFFICE VISIT (OUTPATIENT)
Dept: FAMILY MEDICINE CLINIC | Facility: CLINIC | Age: 20
End: 2025-01-15
Payer: COMMERCIAL

## 2025-01-15 VITALS
BODY MASS INDEX: 16.71 KG/M2 | OXYGEN SATURATION: 99 % | TEMPERATURE: 97.8 F | HEIGHT: 66 IN | WEIGHT: 104 LBS | HEART RATE: 90 BPM | DIASTOLIC BLOOD PRESSURE: 60 MMHG | SYSTOLIC BLOOD PRESSURE: 90 MMHG

## 2025-01-15 DIAGNOSIS — R41.840 ATTENTION OR CONCENTRATION DEFICIT: Primary | ICD-10-CM

## 2025-01-15 DIAGNOSIS — F41.9 ANXIETY AND DEPRESSION: ICD-10-CM

## 2025-01-15 DIAGNOSIS — F32.A ANXIETY AND DEPRESSION: ICD-10-CM

## 2025-01-15 PROCEDURE — 99213 OFFICE O/P EST LOW 20 MIN: CPT | Performed by: FAMILY MEDICINE

## 2025-01-15 RX ORDER — DEXTROAMPHETAMINE SACCHARATE, AMPHETAMINE ASPARTATE MONOHYDRATE, DEXTROAMPHETAMINE SULFATE AND AMPHETAMINE SULFATE 2.5; 2.5; 2.5; 2.5 MG/1; MG/1; MG/1; MG/1
10 CAPSULE, EXTENDED RELEASE ORAL EVERY MORNING
Qty: 30 CAPSULE | Refills: 0 | Status: SHIPPED | OUTPATIENT
Start: 2025-01-15

## 2025-01-15 RX ORDER — ESCITALOPRAM OXALATE 5 MG/1
5 TABLET ORAL EVERY MORNING
Qty: 7 TABLET | Refills: 0 | Status: SHIPPED | OUTPATIENT
Start: 2025-01-15

## 2025-01-15 NOTE — PROGRESS NOTES
"  Subjective   Bhavani Hyde is a 19 y.o. female who is here for   Chief Complaint   Patient presents with    Anxiety    Depression   .     History of Present Illness     Bhavani comes in today to talk about her anxiety and lack of focus.  Reports when she had psychological testing she had both anxiety and ADD.  Has been on Lexapro for a while which generally helps somewhat.  But still having lack of focus.  Not able to multitask.  Starts multiple projects finishing them.  Wean off Lexapro and try Adderall.    The following portions of the patient's history were reviewed and updated as appropriate: allergies, current medications, past family history, past medical history, past social history, past surgical history, and problem list.    Review of Systems    Objective   Vitals:    01/15/25 1416   BP: 90/60   BP Location: Right arm   Patient Position: Sitting   Cuff Size: Adult   Pulse: 90   Temp: 97.8 °F (36.6 °C)   SpO2: 99%   Weight: 47.2 kg (104 lb)   Height: 167.6 cm (66\")      Physical Exam  Vitals reviewed.   Neurological:      Mental Status: She is alert.   Psychiatric:         Mood and Affect: Mood normal.         Assessment & Plan   Diagnoses and all orders for this visit:    1. Attention or concentration deficit (Primary)  -     amphetamine-dextroamphetamine XR (Adderall XR) 10 MG 24 hr capsule; Take 1 capsule by mouth Every Morning  Dispense: 30 capsule; Refill: 0    2. Anxiety and depression  -     escitalopram (LEXAPRO) 5 MG tablet; Take 1 tablet by mouth Every Morning.  Dispense: 7 tablet; Refill: 0    Begin taper off 20 mg Lexapro by taking a 5 mg Lexapro daily for 7 days then stop    There are no Patient Instructions on file for this visit.    Medications Discontinued During This Encounter   Medication Reason    escitalopram (LEXAPRO) 20 MG tablet Reorder        Return in about 1 month (around 2/15/2025) for new medication follow up, controlled medication.    Dr. Alex Gant  [facility] " [facility]  [place], [place]

## 2025-02-25 ENCOUNTER — OFFICE VISIT (OUTPATIENT)
Dept: FAMILY MEDICINE CLINIC | Facility: CLINIC | Age: 20
End: 2025-02-25
Payer: COMMERCIAL

## 2025-02-25 VITALS
DIASTOLIC BLOOD PRESSURE: 70 MMHG | HEART RATE: 77 BPM | BODY MASS INDEX: 16.55 KG/M2 | OXYGEN SATURATION: 98 % | HEIGHT: 66 IN | SYSTOLIC BLOOD PRESSURE: 110 MMHG | TEMPERATURE: 97.5 F | WEIGHT: 103 LBS

## 2025-02-25 DIAGNOSIS — R41.840 ATTENTION OR CONCENTRATION DEFICIT: Primary | ICD-10-CM

## 2025-02-25 PROCEDURE — 99213 OFFICE O/P EST LOW 20 MIN: CPT | Performed by: FAMILY MEDICINE

## 2025-02-25 RX ORDER — DEXTROAMPHETAMINE SACCHARATE, AMPHETAMINE ASPARTATE MONOHYDRATE, DEXTROAMPHETAMINE SULFATE AND AMPHETAMINE SULFATE 5; 5; 5; 5 MG/1; MG/1; MG/1; MG/1
20 CAPSULE, EXTENDED RELEASE ORAL EVERY MORNING
Qty: 30 CAPSULE | Refills: 0 | Status: SHIPPED | OUTPATIENT
Start: 2025-02-25

## 2025-02-25 NOTE — PROGRESS NOTES
"  Chief Complaint   Patient presents with    ADD       Subjective   Bhavani Hyde 19 y.o. female who presents for follow up of for  ADD/ADHD. Patient is well controlled on their current Rx.    No new problems with: insomnia, tachycardia, anxiety, elevated blood pressure, tremor, loose stools or weight loss.     I will continue to see patient for regular follow up appointments.    The medication continues to help with: concentration, finishing tasks in timely manor, and succeeding at work.    Adderall does seem to wear off midday, then her anxiety comes back    Successfully tapered off Lexapro    History of Present Illness     The following portions of the patient's history were reviewed and updated as appropriate: allergies, current medications, past family history, past medical history, past social history, past surgical history, and problem list.    Review of Systems    Vitals:    02/25/25 1553   BP: 110/70   Pulse: 77   Temp: 97.5 °F (36.4 °C)   SpO2: 98%   Weight: 46.7 kg (103 lb)   Height: 167.6 cm (66\")     Wt Readings from Last 3 Encounters:   02/25/25 46.7 kg (103 lb)   01/15/25 47.2 kg (104 lb)   11/19/24 47.4 kg (104 lb 8 oz)     Objective   General:  Alert, pleasant, no distress  Neck:  No thyroid megaly   Lungs: normal respiratory rate, clear  Heart:: regular rate, no murmur  Neuro:  Cranial nerves grossly normal. No tremor  Psych:  Mood stable, clear thought process    Assessment & Plan   Diagnoses and all orders for this visit:    1. Attention or concentration deficit (Primary)  -     amphetamine-dextroamphetamine XR (Adderall XR) 20 MG 24 hr capsule; Take 1 capsule by mouth Every Morning  Dispense: 30 capsule; Refill: 0  -     Compliance Drug Analysis, Ur - Urine, Clean Catch; Future    Increase Adderall XR, up to 20 mg once a day  Come back in 1 month  Stop Lexapro  Call her gynecologist that she is having continued uterine bleeding on her Nexplanon for several months    Medications Discontinued " During This Encounter   Medication Reason    amphetamine-dextroamphetamine XR (Adderall XR) 10 MG 24 hr capsule Dose adjustment    escitalopram (LEXAPRO) 5 MG tablet *Therapy completed        There are no Patient Instructions on file for this visit.  Return in about 1 month (around 3/25/2025) for controlled medication.    Dr. Alex Gant    The patient has read and signed the [facility] Controlled Substance Contract.   The PDMP in Epic which link to [facility] has been reviewed by me today.   The patient is aware of the potential for addiction and dependence and side effects.

## 2025-03-12 ENCOUNTER — TELEPHONE (OUTPATIENT)
Dept: OBSTETRICS AND GYNECOLOGY | Age: 20
End: 2025-03-12
Payer: COMMERCIAL

## 2025-03-12 DIAGNOSIS — Z97.5 BREAKTHROUGH BLEEDING ON NEXPLANON: Primary | ICD-10-CM

## 2025-03-12 DIAGNOSIS — N92.1 BREAKTHROUGH BLEEDING ON NEXPLANON: Primary | ICD-10-CM

## 2025-03-12 RX ORDER — PROMETHAZINE HYDROCHLORIDE 25 MG/1
25 TABLET ORAL EVERY 6 HOURS PRN
Qty: 20 TABLET | Refills: 0 | Status: SHIPPED | OUTPATIENT
Start: 2025-03-12

## 2025-03-12 RX ORDER — NORETHINDRONE AND ETHINYL ESTRADIOL 1 MG-35MCG
KIT ORAL
Qty: 28 TABLET | Refills: 0 | Status: SHIPPED | OUTPATIENT
Start: 2025-03-12

## 2025-03-12 NOTE — TELEPHONE ENCOUNTER
Provider: DARRYL NICOLE    Caller: Bhavani Hyde    Relationship to Patient: Self    Pharmacy: [facility] [place]    Phone Number: [phone]     Reason for Call: PATIENT STATES SHE HAD THE NEXPLANON INSERTED ON 09/05/2024 AND HAS BEEN BLEEDING SINCE.  SHE STATES IT IS NOT HEAVY, EXPLAINS IT AS A MEDIUM FLOW.  PATIENT IS INQUIRING ON IF THERE IS SOMETHING SHE CAN BE PRESCRIBED TO STOP THE BLEEDING OR DOES SHE NEED TO COME IN AND HAVE THE NEXPLANON REMOVED.    When was the patient last seen: 09/05/2024    When did it start: 09/05/2025      Characteristics of symptom/severity: VAGINAL BLEEDING SINCE NEXPLANON INSERTION    Timing- Is it constant or intermittent: CONSTANT

## 2025-03-26 ENCOUNTER — OFFICE VISIT (OUTPATIENT)
Dept: FAMILY MEDICINE CLINIC | Facility: CLINIC | Age: 20
End: 2025-03-26
Payer: COMMERCIAL

## 2025-03-26 ENCOUNTER — OFFICE VISIT (OUTPATIENT)
Dept: OBSTETRICS AND GYNECOLOGY | Age: 20
End: 2025-03-26
Payer: COMMERCIAL

## 2025-03-26 VITALS
SYSTOLIC BLOOD PRESSURE: 102 MMHG | WEIGHT: 105.6 LBS | TEMPERATURE: 97.5 F | HEART RATE: 83 BPM | OXYGEN SATURATION: 97 % | BODY MASS INDEX: 16.97 KG/M2 | DIASTOLIC BLOOD PRESSURE: 70 MMHG | HEIGHT: 66 IN

## 2025-03-26 VITALS
BODY MASS INDEX: 17.04 KG/M2 | DIASTOLIC BLOOD PRESSURE: 72 MMHG | SYSTOLIC BLOOD PRESSURE: 104 MMHG | WEIGHT: 106 LBS | HEIGHT: 66 IN

## 2025-03-26 DIAGNOSIS — Z97.5 NEXPLANON IN PLACE: ICD-10-CM

## 2025-03-26 DIAGNOSIS — Z11.3 SCREENING FOR STDS (SEXUALLY TRANSMITTED DISEASES): ICD-10-CM

## 2025-03-26 DIAGNOSIS — R41.840 ATTENTION OR CONCENTRATION DEFICIT: Primary | ICD-10-CM

## 2025-03-26 DIAGNOSIS — Z97.5 BREAKTHROUGH BLEEDING ON NEXPLANON: Primary | ICD-10-CM

## 2025-03-26 DIAGNOSIS — N92.1 BREAKTHROUGH BLEEDING ON NEXPLANON: Primary | ICD-10-CM

## 2025-03-26 PROCEDURE — 99213 OFFICE O/P EST LOW 20 MIN: CPT | Performed by: FAMILY MEDICINE

## 2025-03-26 RX ORDER — DEXTROAMPHETAMINE SACCHARATE, AMPHETAMINE ASPARTATE MONOHYDRATE, DEXTROAMPHETAMINE SULFATE AND AMPHETAMINE SULFATE 6.25; 6.25; 6.25; 6.25 MG/1; MG/1; MG/1; MG/1
25 CAPSULE, EXTENDED RELEASE ORAL EVERY MORNING
Qty: 30 CAPSULE | Refills: 0 | Status: SHIPPED | OUTPATIENT
Start: 2025-03-26

## 2025-03-26 NOTE — PROGRESS NOTES
"Subjective     History of Present Illness  Bhavani Hyde is a 20 y.o.  female is being seen today for   Chief Complaint   Patient presents with    Gynecologic Exam     Gyn f/u aguilar 1 yr ago     C/o breakthrough bleeding on nexplanon.  Patient called 2 weeks ago with consistent bleeding, described as medium flow. Nexplanon inserted on L arm 2024.  OCPs were called to pharmacy to help with bleeding.  Patient reports she has been taking OCPs daily since and bleeding has stopped.  Patient did have chlamydia infection last year and did not complete test of cure, reports she did complete antibiotic treatment.  Sexually active.  Agreeable to STD testing today.  No vaginal discharge or vaginal itching.      The following portions of the patient's history were reviewed and updated as appropriate: allergies, current medications, past family history, past medical history, past social history, past surgical history and problem list.    /72   Ht 167.6 cm (66\")   Wt 48.1 kg (106 lb)   BMI 17.11 kg/m²         Review of Systems   Constitutional: Negative.    HENT: Negative.     Eyes: Negative.    Respiratory: Negative.     Cardiovascular: Negative.    Gastrointestinal: Negative.    Endocrine: Negative.    Genitourinary:  Positive for vaginal bleeding.   Musculoskeletal: Negative.    Skin: Negative.    Allergic/Immunologic: Negative.    Neurological: Negative.    Hematological: Negative.    Psychiatric/Behavioral: Negative.         Objective   Physical Exam  Constitutional:       General: She is not in acute distress.  Cardiovascular:      Rate and Rhythm: Normal rate and regular rhythm.      Pulses: Normal pulses.      Heart sounds: Normal heart sounds.   Pulmonary:      Effort: Pulmonary effort is normal.      Breath sounds: Normal breath sounds.   Genitourinary:     Exam position: Lithotomy position.      Labia:         Right: No rash, tenderness or lesion.         Left: No rash, tenderness or lesion.       " Vagina: Normal.      Cervix: Normal. No cervical motion tenderness.      Uterus: Normal.       Adnexa: Right adnexa normal and left adnexa normal.   Musculoskeletal:      Comments: Nexplanon palpated on L arm   Neurological:      Mental Status: She is alert and oriented to person, place, and time.   Psychiatric:         Mood and Affect: Mood normal.         Behavior: Behavior normal.         Thought Content: Thought content normal.         Judgment: Judgment normal.           Assessment & Plan   Diagnoses and all orders for this visit:    1. Breakthrough bleeding on Nexplanon (Primary)  -     NuSwab VG+ - Swab, Vagina    2. Screening for STDs (sexually transmitted diseases)  -     NuSwab VG+ - Swab, Vagina    3. Nexplanon in place    -Normal pelvic exam today.  Discussed with patient common complaint of Nexplanon is irregular bleeding.  Advised patient to finish the 1 month pack of OCPs and then evaluate how bleeding is on Nexplanon and let me know.  NuSwab VG+ completed today to rule in/out infection causes for irregular bleeding. Will call patient with results and treat accordingly.  -Advised condom use for STD prevention.    All questions answered. Patient verbalizes understanding and is agreeable to plan.  Return if symptoms worsen or fail to improve.         Camilla Anderson PA-C  3/26/2025 15:57 EDT

## 2025-03-26 NOTE — PROGRESS NOTES
"  Chief Complaint   Patient presents with    ADD    Med Refill       Subjective   Bhavani Hyde 20 y.o. female who presents for follow up of for  ADD/ADHD. Patient is well controlled on their current Rx.    No new problems with: insomnia, tachycardia, anxiety, elevated blood pressure, tremor, loose stools or weight loss.     I will continue to see patient for regular follow up appointments.    The medication continues to help with: concentration, finishing tasks in timely manor, and succeeding at work.    Med working very well  Anxiety is way down  But wearing off after work.    History of Present Illness     The following portions of the patient's history were reviewed and updated as appropriate: allergies, current medications, past family history, past medical history, past social history, past surgical history, and problem list.    Review of Systems    Vitals:    03/26/25 1334   BP: 102/70   Pulse: 83   Temp: 97.5 °F (36.4 °C)   TempSrc: Infrared   SpO2: 97%   Weight: 47.9 kg (105 lb 9.6 oz)   Height: 167.6 cm (66\")     Wt Readings from Last 3 Encounters:   03/26/25 47.9 kg (105 lb 9.6 oz)   02/25/25 46.7 kg (103 lb)   01/15/25 47.2 kg (104 lb)     Objective   General:  Alert, pleasant, no distress  Neck:  No thyroid megaly   Lungs: normal respiratory rate, clear  Heart:: regular rate, no murmur  Neuro:  Cranial nerves grossly normal. No tremor  Psych:  Mood stable, clear thought process    Assessment & Plan   Diagnoses and all orders for this visit:    1. Attention or concentration deficit (Primary)  -     amphetamine-dextroamphetamine XR (Adderall XR) 25 MG 24 hr capsule; Take 1 capsule by mouth Every Morning  Dispense: 30 capsule; Refill: 0    Lets increase adderall xr to 25 mg    UDS now    Medications Discontinued During This Encounter   Medication Reason    amphetamine-dextroamphetamine XR (Adderall XR) 20 MG 24 hr capsule Dose adjustment        There are no Patient Instructions on file for this " visit.  Return in about 4 months (around 7/26/2025) for controlled medication.    Dr. Alex Gant    The patient has read and signed the [facility] Controlled Substance Contract.   The PDMP in Epic which link to [facility] has been reviewed by me today.   The patient is aware of the potential for addiction and dependence and side effects.

## 2025-03-28 LAB
A VAGINAE DNA VAG QL NAA+PROBE: NORMAL SCORE
BVAB2 DNA VAG QL NAA+PROBE: NORMAL SCORE
C ALBICANS DNA VAG QL NAA+PROBE: NEGATIVE
C GLABRATA DNA VAG QL NAA+PROBE: NEGATIVE
C TRACH DNA SPEC QL NAA+PROBE: NEGATIVE
MEGA1 DNA VAG QL NAA+PROBE: NORMAL SCORE
N GONORRHOEA DNA VAG QL NAA+PROBE: NEGATIVE
T VAGINALIS DNA VAG QL NAA+PROBE: NEGATIVE

## 2025-04-02 DIAGNOSIS — K58.0 IRRITABLE BOWEL SYNDROME WITH DIARRHEA: ICD-10-CM

## 2025-04-02 RX ORDER — DICYCLOMINE HYDROCHLORIDE 10 MG/1
10 CAPSULE ORAL
Qty: 120 CAPSULE | Refills: 0 | OUTPATIENT
Start: 2025-04-02

## 2025-04-04 ENCOUNTER — TELEPHONE (OUTPATIENT)
Dept: FAMILY MEDICINE CLINIC | Facility: CLINIC | Age: 20
End: 2025-04-04
Payer: COMMERCIAL

## 2025-04-04 NOTE — TELEPHONE ENCOUNTER
Attempted to reach pt via phone to schedule a lab appointment for UDS. LVM for pt to call and schedule.    PLEASE WARM TRANSFER FOR PT TO SCHEDULE APPT

## 2025-04-14 DIAGNOSIS — N92.1 BREAKTHROUGH BLEEDING ON NEXPLANON: ICD-10-CM

## 2025-04-14 DIAGNOSIS — Z97.5 BREAKTHROUGH BLEEDING ON NEXPLANON: ICD-10-CM

## 2025-04-17 ENCOUNTER — TELEPHONE (OUTPATIENT)
Dept: OBSTETRICS AND GYNECOLOGY | Age: 20
End: 2025-04-17

## 2025-04-17 RX ORDER — PROMETHAZINE HYDROCHLORIDE 25 MG/1
25 TABLET ORAL EVERY 6 HOURS PRN
Qty: 20 TABLET | Refills: 0 | Status: SHIPPED | OUTPATIENT
Start: 2025-04-17

## 2025-04-17 RX ORDER — NORETHINDRONE AND ETHINYL ESTRADIOL 1 MG-35MCG
KIT ORAL
Qty: 28 TABLET | Refills: 0 | Status: SHIPPED | OUTPATIENT
Start: 2025-04-17

## 2025-04-17 NOTE — TELEPHONE ENCOUNTER
Caller: Bhavani Hyde    Relationship: Self    Best call back number: [phone]    Requested Prescriptions:   norethindrone-ethinyl estradiol (Nylia 1/35) 1-35 MG-MCG per tablet      Pharmacy where request should be sent:    [facility] [place] - [address] [facility] - [phone]  - [phone]   Last office visit with prescribing clinician: 3/26/2025   Last telemedicine visit with prescribing clinician: Visit date not found   Next office visit with prescribing clinician: 9/11/2025     Additional details provided by patient:     Does the patient have less than a 3 day supply:  [x] Yes  [] No    Would you like a call back once the refill request has been completed: [x] Yes [] No    If the office needs to give you a call back, can they leave a voicemail: [x] Yes [] No    Da Delarosa Rep   04/17/25 11:12 EDT

## 2025-04-23 DIAGNOSIS — R41.840 ATTENTION OR CONCENTRATION DEFICIT: ICD-10-CM

## 2025-04-23 RX ORDER — DEXTROAMPHETAMINE SACCHARATE, AMPHETAMINE ASPARTATE MONOHYDRATE, DEXTROAMPHETAMINE SULFATE AND AMPHETAMINE SULFATE 6.25; 6.25; 6.25; 6.25 MG/1; MG/1; MG/1; MG/1
25 CAPSULE, EXTENDED RELEASE ORAL EVERY MORNING
Qty: 30 CAPSULE | Refills: 0 | Status: SHIPPED | OUTPATIENT
Start: 2025-04-23

## 2025-05-22 ENCOUNTER — TELEPHONE (OUTPATIENT)
Dept: OBSTETRICS AND GYNECOLOGY | Age: 20
End: 2025-05-22

## 2025-05-22 DIAGNOSIS — Z97.5 BREAKTHROUGH BLEEDING ON NEXPLANON: ICD-10-CM

## 2025-05-22 DIAGNOSIS — N92.1 BREAKTHROUGH BLEEDING ON NEXPLANON: ICD-10-CM

## 2025-05-22 RX ORDER — NORETHINDRONE AND ETHINYL ESTRADIOL 1 MG-35MCG
KIT ORAL
Qty: 28 TABLET | Refills: 0 | Status: SHIPPED | OUTPATIENT
Start: 2025-05-22

## 2025-05-22 NOTE — TELEPHONE ENCOUNTER
Caller: Bhavani Hyde    Relationship: Self    Best call back number: [phone]    Requested Prescriptions: NYLIA  Requested Prescriptions      No prescriptions requested or ordered in this encounter        [place] [facility] [place] [facility] [address] [phone] [facility] [phone] FX     Last office visit with prescribing clinician: 3/26/2025   Last telemedicine visit with prescribing clinician: Visit date not found   Next office visit with prescribing clinician: 9/11/2025     Additional details provided by patient: PATIENT TOOK THE PILLS WHEN GIVEN LAST TIME IT STOPPED THE BLEEDING BUT SHE STARTED 2 WEEKS AGO AND SHE THOUGHT IT WAS HER REGULAR PERIOD BT SHE HASN'T STOPPED AGAIN AFTER 2 WEEKS SO SHE WOULD LIKE TO HAVE THE PILLS SENT INT O TRY IT AGAIN TO SEE IF SHE STOPS    Does the patient have less than a 3 day supply:  [x] Yes  [] No    Would you like a call back once the refill request has been completed: [x] Yes [] No    If the office needs to give you a call back, can they leave a voicemail: [x] Yes [] No    Da Chaves Rep   05/22/25 10:56 EDT

## 2025-05-30 DIAGNOSIS — R41.840 ATTENTION OR CONCENTRATION DEFICIT: ICD-10-CM

## 2025-05-30 RX ORDER — DEXTROAMPHETAMINE SACCHARATE, AMPHETAMINE ASPARTATE MONOHYDRATE, DEXTROAMPHETAMINE SULFATE AND AMPHETAMINE SULFATE 6.25; 6.25; 6.25; 6.25 MG/1; MG/1; MG/1; MG/1
25 CAPSULE, EXTENDED RELEASE ORAL EVERY MORNING
Qty: 30 CAPSULE | Refills: 0 | OUTPATIENT
Start: 2025-05-30

## 2025-05-30 NOTE — TELEPHONE ENCOUNTER
Caller: Bhavani Hyde    Relationship: Self    Best call back number: [phone]    Requested Prescriptions:   Requested Prescriptions     Pending Prescriptions Disp Refills    amphetamine-dextroamphetamine XR (Adderall XR) 25 MG 24 hr capsule 30 capsule 0     Sig: Take 1 capsule by mouth Every Morning        Pharmacy where request should be sent: [facility] - [place], [place] - [address] [facility] - [phone]  - [phone] FX     Last office visit with prescribing clinician: 3/26/2025   Last telemedicine visit with prescribing clinician: Visit date not found   Next office visit with prescribing clinician: 7/9/2025     Additional details provided by patient: PATIENT NEEDS REFILL     TOOK LAST PILL THIS MORNING    Would you like a call back once the refill request has been completed: [] Yes [x] No    If the office needs to give you a call back, can they leave a voicemail: [] Yes [x] No    Da Santana Rep   05/30/25 13:35 EDT

## 2025-05-31 RX ORDER — DEXTROAMPHETAMINE SACCHARATE, AMPHETAMINE ASPARTATE MONOHYDRATE, DEXTROAMPHETAMINE SULFATE AND AMPHETAMINE SULFATE 6.25; 6.25; 6.25; 6.25 MG/1; MG/1; MG/1; MG/1
25 CAPSULE, EXTENDED RELEASE ORAL EVERY MORNING
Qty: 30 CAPSULE | Refills: 0 | Status: SHIPPED | OUTPATIENT
Start: 2025-05-31

## 2025-06-09 ENCOUNTER — OFFICE VISIT (OUTPATIENT)
Dept: OBSTETRICS AND GYNECOLOGY | Age: 20
End: 2025-06-09
Payer: COMMERCIAL

## 2025-06-09 VITALS
DIASTOLIC BLOOD PRESSURE: 62 MMHG | WEIGHT: 112 LBS | BODY MASS INDEX: 18 KG/M2 | SYSTOLIC BLOOD PRESSURE: 100 MMHG | HEIGHT: 66 IN

## 2025-06-09 DIAGNOSIS — N92.6 IRREGULAR MENSTRUAL BLEEDING: ICD-10-CM

## 2025-06-09 DIAGNOSIS — Z30.011 ENCOUNTER FOR BCP (BIRTH CONTROL PILLS) INITIAL PRESCRIPTION: ICD-10-CM

## 2025-06-09 DIAGNOSIS — N92.1 BREAKTHROUGH BLEEDING ON NEXPLANON: Primary | ICD-10-CM

## 2025-06-09 DIAGNOSIS — Z97.5 NEXPLANON IN PLACE: ICD-10-CM

## 2025-06-09 DIAGNOSIS — Z97.5 BREAKTHROUGH BLEEDING ON NEXPLANON: Primary | ICD-10-CM

## 2025-06-09 RX ORDER — NORETHINDRONE ACETATE AND ETHINYL ESTRADIOL 1MG-20(21)
1 KIT ORAL DAILY
Qty: 28 TABLET | Refills: 4 | Status: SHIPPED | OUTPATIENT
Start: 2025-06-09

## 2025-06-09 NOTE — PROGRESS NOTES
"Subjective     History of Present Illness  Bhavani Hyde is a 20 y.o.  female is being seen today for   Chief Complaint   Patient presents with    Consult     Discuss BC, abnormal periods, doesn't want to take pills with it.     C/o irregular periods with nexplanon. This has been occurring since nexplanon was inserted. Nexplanon inserted on L arm 2024.  Describes bleeding as light to medium flow, not heavy.  She has taken birth control pills a few times to stop bleeding and this does work.  She is wondering if she could continue pills. No new sex partners. No vaginal discharge or itching.           The following portions of the patient's history were reviewed and updated as appropriate: allergies, current medications, past family history, past medical history, past social history, past surgical history and problem list.    /62   Ht 167.6 cm (66\")   Wt 50.8 kg (112 lb)   LMP 2025   BMI 18.08 kg/m²         Review of Systems   Constitutional: Negative.    HENT: Negative.     Eyes: Negative.    Respiratory: Negative.     Cardiovascular: Negative.    Gastrointestinal: Negative.    Endocrine: Negative.    Genitourinary:  Positive for menstrual problem.   Musculoskeletal: Negative.    Skin: Negative.    Allergic/Immunologic: Negative.    Neurological: Negative.    Hematological: Negative.    Psychiatric/Behavioral: Negative.         Objective   Physical Exam  Constitutional:       General: She is not in acute distress.  Cardiovascular:      Rate and Rhythm: Normal rate and regular rhythm.      Pulses: Normal pulses.      Heart sounds: Normal heart sounds.   Pulmonary:      Effort: Pulmonary effort is normal.      Breath sounds: Normal breath sounds.   Genitourinary:     Exam position: Lithotomy position.      Labia:         Right: No rash, tenderness or lesion.         Left: No rash, tenderness or lesion.       Vagina: Normal.      Cervix: Normal. No cervical motion tenderness.      Uterus: " Normal.       Adnexa: Right adnexa normal and left adnexa normal.   Musculoskeletal:      Comments: Nexplanon palpated on L arm   Neurological:      Mental Status: She is alert and oriented to person, place, and time.   Psychiatric:         Mood and Affect: Mood normal.         Behavior: Behavior normal.         Thought Content: Thought content normal.         Judgment: Judgment normal.           Assessment & Plan   Diagnoses and all orders for this visit:    1. Breakthrough bleeding on Nexplanon (Primary)  -     TSH Rfx On Abnormal To Free T4  -     norethindrone-ethinyl estradiol FE (Junel FE 1/20) 1-20 MG-MCG per tablet; Take 1 tablet by mouth Daily.  Dispense: 28 tablet; Refill: 4    2. Irregular menstrual bleeding  -     TSH Rfx On Abnormal To Free T4    3. Nexplanon in place    4. Encounter for BCP (birth control pills) initial prescription  -     norethindrone-ethinyl estradiol FE (Junel FE 1/20) 1-20 MG-MCG per tablet; Take 1 tablet by mouth Daily.  Dispense: 28 tablet; Refill: 4    -Normal pelvic exam.  -Nexplanon palpated on L arm, nexplanon in place.   -Discussed that she can take birth control pills daily to regulate bleeding. She has tolerated OCPs so far. Blood clot risks reviewed, go to ER if this occurs. Low dose OCP sent to pharmacy. Pill schedule reviewed.  She has annual exam already scheduled with me in 3 months.  Discussed that if she finds that she takes pills every day consistently, could consider at that point removing Nexplanon and switching to birth control pills only.  Call sooner if symptoms do not improve.  -TSH lab ordered today d/t irregular bleeding. Will call patient with results and treat accordingly.    All questions answered. Patient verbalizes understanding and is agreeable to plan.  Return if symptoms worsen or fail to improve.         Camilla Anderson PA-C  6/9/2025 18:33 EDT

## 2025-06-30 DIAGNOSIS — R41.840 ATTENTION OR CONCENTRATION DEFICIT: ICD-10-CM

## 2025-07-01 RX ORDER — DEXTROAMPHETAMINE SACCHARATE, AMPHETAMINE ASPARTATE MONOHYDRATE, DEXTROAMPHETAMINE SULFATE AND AMPHETAMINE SULFATE 6.25; 6.25; 6.25; 6.25 MG/1; MG/1; MG/1; MG/1
25 CAPSULE, EXTENDED RELEASE ORAL EVERY MORNING
Qty: 30 CAPSULE | Refills: 0 | Status: SHIPPED | OUTPATIENT
Start: 2025-07-01

## 2025-08-04 DIAGNOSIS — R41.840 ATTENTION OR CONCENTRATION DEFICIT: ICD-10-CM

## 2025-08-05 RX ORDER — DEXTROAMPHETAMINE SACCHARATE, AMPHETAMINE ASPARTATE MONOHYDRATE, DEXTROAMPHETAMINE SULFATE AND AMPHETAMINE SULFATE 6.25; 6.25; 6.25; 6.25 MG/1; MG/1; MG/1; MG/1
25 CAPSULE, EXTENDED RELEASE ORAL EVERY MORNING
Qty: 30 CAPSULE | Refills: 0 | OUTPATIENT
Start: 2025-08-05

## 2025-08-06 DIAGNOSIS — R41.840 ATTENTION OR CONCENTRATION DEFICIT: ICD-10-CM

## 2025-08-07 RX ORDER — DEXTROAMPHETAMINE SACCHARATE, AMPHETAMINE ASPARTATE MONOHYDRATE, DEXTROAMPHETAMINE SULFATE AND AMPHETAMINE SULFATE 6.25; 6.25; 6.25; 6.25 MG/1; MG/1; MG/1; MG/1
25 CAPSULE, EXTENDED RELEASE ORAL EVERY MORNING
Qty: 30 CAPSULE | Refills: 0 | Status: SHIPPED | OUTPATIENT
Start: 2025-08-07